# Patient Record
Sex: MALE | Race: WHITE | HISPANIC OR LATINO | Employment: OTHER | URBAN - METROPOLITAN AREA
[De-identification: names, ages, dates, MRNs, and addresses within clinical notes are randomized per-mention and may not be internally consistent; named-entity substitution may affect disease eponyms.]

---

## 2018-03-19 NOTE — PATIENT DISCUSSION
Patient discontinued Plaquenil 1 week ago 2ndry to high blood pressure. We are holding off on the Visual Field and color vision test at this time.  If patient starts taking a medication for Lupus we will need to order a tech only Visual Field with Color Vision Test.

## 2021-02-03 ENCOUNTER — NEW PATIENT COMPREHENSIVE (OUTPATIENT)
Dept: URBAN - METROPOLITAN AREA CLINIC 35 | Facility: CLINIC | Age: 41
End: 2021-02-03

## 2021-02-03 DIAGNOSIS — H04.123: ICD-10-CM

## 2021-02-03 DIAGNOSIS — H52.203: ICD-10-CM

## 2021-02-03 PROCEDURE — 92015 DETERMINE REFRACTIVE STATE: CPT

## 2021-02-03 PROCEDURE — 92004 COMPRE OPH EXAM NEW PT 1/>: CPT

## 2021-02-03 ASSESSMENT — VISUAL ACUITY
OD_SC: 20/200
OS_SC: J12
OD_CC: 20/25
OD_SC: J12
OS_CC: 20/25
OD_CC: J3
OS_CC: J4+
OS_SC: 20/80

## 2021-02-03 ASSESSMENT — TONOMETRY
OS_IOP_MMHG: 10
OD_IOP_MMHG: 10